# Patient Record
Sex: FEMALE | URBAN - METROPOLITAN AREA
[De-identification: names, ages, dates, MRNs, and addresses within clinical notes are randomized per-mention and may not be internally consistent; named-entity substitution may affect disease eponyms.]

---

## 2023-10-19 ENCOUNTER — APPOINTMENT (RX ONLY)
Dept: URBAN - METROPOLITAN AREA CLINIC 86 | Facility: CLINIC | Age: 63
Setting detail: DERMATOLOGY
End: 2023-10-19

## 2023-10-19 DIAGNOSIS — L85.3 XEROSIS CUTIS: ICD-10-CM | Status: INADEQUATELY CONTROLLED

## 2023-10-19 DIAGNOSIS — L81.4 OTHER MELANIN HYPERPIGMENTATION: ICD-10-CM

## 2023-10-19 DIAGNOSIS — D22 MELANOCYTIC NEVI: ICD-10-CM

## 2023-10-19 PROBLEM — D22.9 MELANOCYTIC NEVI, UNSPECIFIED: Status: ACTIVE | Noted: 2023-10-19

## 2023-10-19 PROBLEM — D48.5 NEOPLASM OF UNCERTAIN BEHAVIOR OF SKIN: Status: ACTIVE | Noted: 2023-10-19

## 2023-10-19 PROCEDURE — ? PRESCRIPTION MEDICATION MANAGEMENT

## 2023-10-19 PROCEDURE — 11300 SHAVE SKIN LESION 0.5 CM/<: CPT

## 2023-10-19 PROCEDURE — ? SHAVE REMOVAL

## 2023-10-19 PROCEDURE — ? COUNSELING

## 2023-10-19 PROCEDURE — 11300 SHAVE SKIN LESION 0.5 CM/<: CPT | Mod: 76

## 2023-10-19 PROCEDURE — 99204 OFFICE O/P NEW MOD 45 MIN: CPT | Mod: 25

## 2023-10-19 ASSESSMENT — LOCATION ZONE DERM: LOCATION ZONE: LEG

## 2023-10-19 ASSESSMENT — LOCATION DETAILED DESCRIPTION DERM: LOCATION DETAILED: LEFT ANTERIOR PROXIMAL THIGH

## 2023-10-19 ASSESSMENT — LOCATION SIMPLE DESCRIPTION DERM: LOCATION SIMPLE: LEFT THIGH

## 2023-10-19 NOTE — PROCEDURE: SHAVE REMOVAL
Medical Necessity Information: It is in your best interest to select a reason for this procedure from the list below. All of these items fulfill various CMS LCD requirements except the new and changing color options.
Medical Necessity Clause: This procedure was medically necessary because the lesion that was treated was:
Lab: [id number]
Lab Facility: [id number]
Body Location Override (Optional - Billing Will Still Be Based On Selected Body Map Location If Applicable): left lateral thigh proximal
Detail Level: Detailed
Was A Bandage Applied: Yes
Size Of Lesion In Cm (Required): 0.3
X Size Of Lesion In Cm (Optional): 0
Depth Of Shave: dermis
Biopsy Method: Dermablade
Anesthesia Type: 1% lidocaine with epinephrine
Hemostasis: Drysol
Wound Care: Bacitracin
Path Notes (To The Dermatopathologist): Size: 0.3
Render Path Notes In Note?: No
Consent was obtained from the patient. The risks and benefits to therapy were discussed in detail. Specifically, the risks of infection, scarring, bleeding, prolonged wound healing, incomplete removal, allergy to anesthesia, nerve injury and recurrence were addressed. Prior to the procedure, the treatment site was clearly identified and confirmed by the patient. All components of Universal Protocol/PAUSE Rule completed.
Post-Care Instructions: I reviewed with the patient in detail post-care instructions. Patient is to keep the biopsy site dry overnight, and then apply bacitracin twice daily until healed. Patient may apply hydrogen peroxide soaks to remove any crusting.
Notification Instructions: Patient will be notified of pathology results. However, patient instructed to call the office if not contacted within 2 weeks.
Billing Type: Third-Party Bill
Body Location Override (Optional - Billing Will Still Be Based On Selected Body Map Location If Applicable): left lateral thigh distal

## 2023-11-09 ENCOUNTER — APPOINTMENT (RX ONLY)
Dept: URBAN - METROPOLITAN AREA CLINIC 86 | Facility: CLINIC | Age: 63
Setting detail: DERMATOLOGY
End: 2023-11-09

## 2023-11-09 DIAGNOSIS — L81.4 OTHER MELANIN HYPERPIGMENTATION: ICD-10-CM

## 2023-11-09 DIAGNOSIS — L82.0 INFLAMED SEBORRHEIC KERATOSIS: ICD-10-CM

## 2023-11-09 DIAGNOSIS — D22 MELANOCYTIC NEVI: ICD-10-CM

## 2023-11-09 PROBLEM — D22.9 MELANOCYTIC NEVI, UNSPECIFIED: Status: ACTIVE | Noted: 2023-11-09

## 2023-11-09 PROBLEM — D48.5 NEOPLASM OF UNCERTAIN BEHAVIOR OF SKIN: Status: ACTIVE | Noted: 2023-11-09

## 2023-11-09 PROCEDURE — ? COUNSELING

## 2023-11-09 PROCEDURE — 11300 SHAVE SKIN LESION 0.5 CM/<: CPT

## 2023-11-09 PROCEDURE — 99213 OFFICE O/P EST LOW 20 MIN: CPT | Mod: 25

## 2023-11-09 PROCEDURE — 11301 SHAVE SKIN LESION 0.6-1.0 CM: CPT

## 2023-11-09 PROCEDURE — 11300 SHAVE SKIN LESION 0.5 CM/<: CPT | Mod: 76

## 2023-11-09 PROCEDURE — ? SHAVE REMOVAL

## 2023-11-09 PROCEDURE — ? PATHOLOGY DISCUSSION

## 2023-11-09 ASSESSMENT — LOCATION DETAILED DESCRIPTION DERM
LOCATION DETAILED: LEFT RIB CAGE
LOCATION DETAILED: SUBXIPHOID
LOCATION DETAILED: LEFT ANTERIOR DISTAL THIGH

## 2023-11-09 ASSESSMENT — LOCATION SIMPLE DESCRIPTION DERM
LOCATION SIMPLE: ABDOMEN
LOCATION SIMPLE: LEFT THIGH

## 2023-11-09 ASSESSMENT — LOCATION ZONE DERM
LOCATION ZONE: TRUNK
LOCATION ZONE: LEG

## 2023-11-09 NOTE — PROCEDURE: SHAVE REMOVAL
Medical Necessity Information: It is in your best interest to select a reason for this procedure from the list below. All of these items fulfill various CMS LCD requirements except the new and changing color options.
Medical Necessity Clause: This procedure was medically necessary because the lesion that was treated was:
Lab: [id number] [facility]
Lab Facility: [address] [place] [address]
Body Location Override (Optional - Billing Will Still Be Based On Selected Body Map Location If Applicable): left lateral thigh proximal
Detail Level: Detailed
Was A Bandage Applied: Yes
Size Of Lesion In Cm (Required): 0.5
X Size Of Lesion In Cm (Optional): 0
Depth Of Shave: dermis
Biopsy Method: Dermablade
Anesthesia Type: 1% lidocaine with epinephrine
Hemostasis: Drysol
Wound Care: Bacitracin
Path Notes (To The Dermatopathologist): Size: 0.5
Render Path Notes In Note?: No
Consent was obtained from the patient. The risks and benefits to therapy were discussed in detail. Specifically, the risks of infection, scarring, bleeding, prolonged wound healing, incomplete removal, allergy to anesthesia, nerve injury and recurrence were addressed. Prior to the procedure, the treatment site was clearly identified and confirmed by the patient. All components of Universal Protocol/PAUSE Rule completed.
Post-Care Instructions: I reviewed with the patient in detail post-care instructions. Patient is to keep the biopsy site dry overnight, and then apply bacitracin twice daily until healed. Patient may apply hydrogen peroxide soaks to remove any crusting.
Notification Instructions: Patient will be notified of pathology results. However, patient instructed to call the office if not contacted within 2 weeks.
Billing Type: [employer]
Lab: [id number]
Lab Facility: [id number]
Body Location Override (Optional - Billing Will Still Be Based On Selected Body Map Location If Applicable): left lateral thigh distal
Billing Type: Third-Party Bill
Body Location Override (Optional - Billing Will Still Be Based On Selected Body Map Location If Applicable): left inframammary fold
Size Of Lesion In Cm (Required): 0.8
Path Notes (To The Dermatopathologist): Size: 0.8
Body Location Override (Optional - Billing Will Still Be Based On Selected Body Map Location If Applicable): left lateral abdomen
Size Of Lesion In Cm (Required): 0.3
Path Notes (To The Dermatopathologist): Size: 0.3

## 2024-03-27 ENCOUNTER — APPOINTMENT (RX ONLY)
Dept: URBAN - METROPOLITAN AREA CLINIC 86 | Facility: CLINIC | Age: 64
Setting detail: DERMATOLOGY
End: 2024-03-27

## 2024-03-27 DIAGNOSIS — L20.9 ATOPIC DERMATITIS, UNSPECIFIED: ICD-10-CM

## 2024-03-27 DIAGNOSIS — D22 MELANOCYTIC NEVI: ICD-10-CM

## 2024-03-27 PROBLEM — D48.5 NEOPLASM OF UNCERTAIN BEHAVIOR OF SKIN: Status: ACTIVE | Noted: 2024-03-27

## 2024-03-27 PROCEDURE — 99213 OFFICE O/P EST LOW 20 MIN: CPT | Mod: 25

## 2024-03-27 PROCEDURE — ? SHAVE REMOVAL

## 2024-03-27 PROCEDURE — 11301 SHAVE SKIN LESION 0.6-1.0 CM: CPT | Mod: 76

## 2024-03-27 PROCEDURE — 11300 SHAVE SKIN LESION 0.5 CM/<: CPT

## 2024-03-27 PROCEDURE — ? PRESCRIPTION

## 2024-03-27 PROCEDURE — 11301 SHAVE SKIN LESION 0.6-1.0 CM: CPT

## 2024-03-27 PROCEDURE — ? COUNSELING

## 2024-03-27 PROCEDURE — ? PRESCRIPTION MEDICATION MANAGEMENT

## 2024-03-27 RX ORDER — CLOBETASOL PROPIONATE 0.5 MG/G
CREAM TOPICAL
Qty: 60 | Refills: 0 | Status: ERX | COMMUNITY
Start: 2024-03-27

## 2024-03-27 RX ADMIN — CLOBETASOL PROPIONATE: 0.5 CREAM TOPICAL at 00:00

## 2024-03-27 ASSESSMENT — LOCATION DETAILED DESCRIPTION DERM
LOCATION DETAILED: LEFT SUPERIOR MEDIAL UPPER BACK
LOCATION DETAILED: RIGHT MEDIAL UPPER BACK
LOCATION DETAILED: LEFT SUPERIOR LATERAL UPPER BACK
LOCATION DETAILED: RIGHT SUPERIOR UPPER BACK
LOCATION DETAILED: LEFT MID-UPPER BACK

## 2024-03-27 ASSESSMENT — LOCATION ZONE DERM: LOCATION ZONE: TRUNK

## 2024-03-27 ASSESSMENT — LOCATION SIMPLE DESCRIPTION DERM
LOCATION SIMPLE: LEFT UPPER BACK
LOCATION SIMPLE: RIGHT UPPER BACK

## 2024-03-27 NOTE — PROCEDURE: PRESCRIPTION MEDICATION MANAGEMENT
Initiate Treatment: Clobetasol cream 0.05% BID, PRN
Render In Strict Bullet Format?: No
Detail Level: Zone

## 2024-03-27 NOTE — PROCEDURE: SHAVE REMOVAL
Medical Necessity Information: It is in your best interest to select a reason for this procedure from the list below. All of these items fulfill various CMS LCD requirements except the new and changing color options.
Medical Necessity Clause: This procedure was medically necessary because the lesion that was treated was:
Lab: [id number]
Lab Facility: [id number]
Body Location Override (Optional - Billing Will Still Be Based On Selected Body Map Location If Applicable): left thoracic PV superior
Detail Level: Detailed
Was A Bandage Applied: Yes
Size Of Lesion In Cm (Required): 0.5
X Size Of Lesion In Cm (Optional): 0
Depth Of Shave: dermis
Biopsy Method: Dermablade
Anesthesia Type: 1% lidocaine with epinephrine
Hemostasis: Drysol
Wound Care: Bacitracin
Path Notes (To The Dermatopathologist): Size: 0.5
Render Path Notes In Note?: No
Consent was obtained from the patient. The risks and benefits to therapy were discussed in detail. Specifically, the risks of infection, scarring, bleeding, prolonged wound healing, incomplete removal, allergy to anesthesia, nerve injury and recurrence were addressed. Prior to the procedure, the treatment site was clearly identified and confirmed by the patient. All components of Universal Protocol/PAUSE Rule completed.
Post-Care Instructions: I reviewed with the patient in detail post-care instructions. Patient is to keep the biopsy site dry overnight, and then apply bacitracin twice daily until healed. Patient may apply hydrogen peroxide soaks to remove any crusting.
Notification Instructions: Patient will be notified of pathology results. However, patient instructed to call the office if not contacted within 2 weeks.
Billing Type: Third-Party Bill
Body Location Override (Optional - Billing Will Still Be Based On Selected Body Map Location If Applicable): left thoracic PV inferior
Size Of Lesion In Cm (Required): 0.7
Path Notes (To The Dermatopathologist): Size: 0.7
Body Location Override (Optional - Billing Will Still Be Based On Selected Body Map Location If Applicable): left posterior shoulder
Size Of Lesion In Cm (Required): 0.8
Path Notes (To The Dermatopathologist): Size: 0.8

## 2024-04-10 ENCOUNTER — APPOINTMENT (RX ONLY)
Dept: URBAN - METROPOLITAN AREA CLINIC 86 | Facility: CLINIC | Age: 64
Setting detail: DERMATOLOGY
End: 2024-04-10

## 2024-04-15 ENCOUNTER — APPOINTMENT (RX ONLY)
Dept: URBAN - METROPOLITAN AREA CLINIC 48 | Facility: CLINIC | Age: 64
Setting detail: DERMATOLOGY
End: 2024-04-15

## 2024-04-15 DIAGNOSIS — L20.9 ATOPIC DERMATITIS, UNSPECIFIED: ICD-10-CM

## 2024-04-15 DIAGNOSIS — L82.0 INFLAMED SEBORRHEIC KERATOSIS: ICD-10-CM

## 2024-04-15 PROCEDURE — ? PATHOLOGY DISCUSSION

## 2024-04-15 PROCEDURE — ? COUNSELING

## 2024-04-15 PROCEDURE — 99213 OFFICE O/P EST LOW 20 MIN: CPT

## 2024-04-15 PROCEDURE — ? PRESCRIPTION MEDICATION MANAGEMENT

## 2024-04-15 ASSESSMENT — LOCATION DETAILED DESCRIPTION DERM
LOCATION DETAILED: RIGHT MEDIAL UPPER BACK
LOCATION DETAILED: RIGHT SUPERIOR UPPER BACK

## 2024-04-15 ASSESSMENT — LOCATION SIMPLE DESCRIPTION DERM: LOCATION SIMPLE: RIGHT UPPER BACK

## 2024-04-15 ASSESSMENT — LOCATION ZONE DERM: LOCATION ZONE: TRUNK

## 2025-07-03 ENCOUNTER — APPOINTMENT (OUTPATIENT)
Dept: URBAN - METROPOLITAN AREA CLINIC 86 | Facility: CLINIC | Age: 65
Setting detail: DERMATOLOGY
End: 2025-07-03

## 2025-07-03 DIAGNOSIS — L85.3 XEROSIS CUTIS: ICD-10-CM

## 2025-07-03 DIAGNOSIS — L81.4 OTHER MELANIN HYPERPIGMENTATION: ICD-10-CM

## 2025-07-03 DIAGNOSIS — D17 BENIGN LIPOMATOUS NEOPLASM: ICD-10-CM | Status: INADEQUATELY CONTROLLED

## 2025-07-03 PROBLEM — D17.1 BENIGN LIPOMATOUS NEOPLASM OF SKIN AND SUBCUTANEOUS TISSUE OF TRUNK: Status: ACTIVE | Noted: 2025-07-03

## 2025-07-03 PROCEDURE — ? COUNSELING

## 2025-07-03 PROCEDURE — ? ORDER MRI

## 2025-07-03 ASSESSMENT — LOCATION SIMPLE DESCRIPTION DERM: LOCATION SIMPLE: LEFT UPPER BACK

## 2025-07-03 ASSESSMENT — LOCATION DETAILED DESCRIPTION DERM: LOCATION DETAILED: LEFT INFERIOR UPPER BACK

## 2025-07-03 ASSESSMENT — LOCATION ZONE DERM: LOCATION ZONE: TRUNK

## 2025-07-03 NOTE — PROCEDURE: ORDER MRI
Priority: high
Time Frame Unit: day(s)
Detail Level: Detailed
Provider: Higinio Kelly PA-C
Time Frame Number: ASAP
Other Protocol: And with contrast
Lab: 0

## 2025-07-03 NOTE — PROCEDURE: MIPS QUALITY
Quality 130: Documentation Of Current Medications In The Medical Record: Current Medications Documented
Quality 431: Preventive Care And Screening: Unhealthy Alcohol Use - Screening: Patient not identified as an unhealthy alcohol user when screened for unhealthy alcohol use using a systematic screening method
Detail Level: Detailed
Quality 487: Screening For Social Drivers Of Health: Patient reason for not screening for food insecurity, housing instability, transportation needs, utility difficulties, and interpersonal safety (e.g., patient declined or other patient reasons)
Quality 226: Preventive Care And Screening: Tobacco Use: Screening And Cessation Intervention: Patient screened for tobacco use and is an ex/non-smoker

## 2025-07-08 ENCOUNTER — APPOINTMENT (OUTPATIENT)
Dept: URBAN - METROPOLITAN AREA CLINIC 48 | Facility: CLINIC | Age: 65
Setting detail: DERMATOLOGY
End: 2025-07-08

## 2025-07-08 DIAGNOSIS — D17 BENIGN LIPOMATOUS NEOPLASM: ICD-10-CM

## 2025-07-08 PROCEDURE — ? ORDER CHEST MRI

## 2025-07-08 NOTE — PROCEDURE: ORDER CHEST MRI
Provider: Higinio Kelly PA-C
Priority: high
Other Misc, Right Pelvis: MRI soft tissue of the chest with and without contract for lipoma of the left inferior upper back
Professional Component, Technical Component Or Both?: technical component

## 2025-07-22 ENCOUNTER — APPOINTMENT (OUTPATIENT)
Dept: URBAN - METROPOLITAN AREA CLINIC 86 | Facility: CLINIC | Age: 65
Setting detail: DERMATOLOGY
End: 2025-07-22

## 2025-07-22 DIAGNOSIS — Z41.9 ENCOUNTER FOR PROCEDURE FOR PURPOSES OTHER THAN REMEDYING HEALTH STATE, UNSPECIFIED: ICD-10-CM | Status: INADEQUATELY CONTROLLED

## 2025-07-22 DIAGNOSIS — L85.3 XEROSIS CUTIS: ICD-10-CM | Status: INADEQUATELY CONTROLLED

## 2025-07-22 PROCEDURE — ? COUNSELING

## 2025-07-22 PROCEDURE — ? PRESCRIPTION MEDICATION MANAGEMENT

## 2025-07-22 PROCEDURE — ? ADDITIONAL NOTES

## 2025-07-22 NOTE — PROCEDURE: ADDITIONAL NOTES
Detail Level: Simple
Render Risk Assessment In Note?: no
Additional Notes: Provider went over results today. No further treatment needing.